# Patient Record
Sex: FEMALE | URBAN - METROPOLITAN AREA
[De-identification: names, ages, dates, MRNs, and addresses within clinical notes are randomized per-mention and may not be internally consistent; named-entity substitution may affect disease eponyms.]

---

## 2021-06-21 ENCOUNTER — APPOINTMENT (RX ONLY)
Dept: URBAN - METROPOLITAN AREA CLINIC 11 | Facility: CLINIC | Age: 61
Setting detail: DERMATOLOGY
End: 2021-06-21

## 2021-06-21 DIAGNOSIS — L20.89 OTHER ATOPIC DERMATITIS: ICD-10-CM | Status: INADEQUATELY CONTROLLED

## 2021-06-21 PROCEDURE — ? PHOTO-DOCUMENTATION

## 2021-06-21 PROCEDURE — 99204 OFFICE O/P NEW MOD 45 MIN: CPT

## 2021-06-21 PROCEDURE — ? COUNSELING

## 2021-06-21 PROCEDURE — ? MEDICATION COUNSELING

## 2021-06-21 PROCEDURE — ? ADDITIONAL NOTES

## 2021-06-21 PROCEDURE — ? PRESCRIPTION MEDICATION MANAGEMENT

## 2021-06-21 ASSESSMENT — LOCATION DETAILED DESCRIPTION DERM
LOCATION DETAILED: RIGHT INFERIOR ANTERIOR NECK
LOCATION DETAILED: LEFT DISTAL POSTERIOR UPPER ARM
LOCATION DETAILED: RIGHT DISTAL POSTERIOR UPPER ARM
LOCATION DETAILED: LEFT DISTAL PRETIBIAL REGION
LOCATION DETAILED: RIGHT DISTAL PRETIBIAL REGION

## 2021-06-21 ASSESSMENT — LOCATION SIMPLE DESCRIPTION DERM
LOCATION SIMPLE: RIGHT PRETIBIAL REGION
LOCATION SIMPLE: LEFT PRETIBIAL REGION
LOCATION SIMPLE: RIGHT ANTERIOR NECK
LOCATION SIMPLE: RIGHT POSTERIOR UPPER ARM
LOCATION SIMPLE: LEFT POSTERIOR UPPER ARM

## 2021-06-21 ASSESSMENT — LOCATION ZONE DERM
LOCATION ZONE: NECK
LOCATION ZONE: ARM
LOCATION ZONE: LEG

## 2021-06-21 NOTE — PROCEDURE: ADDITIONAL NOTES
Detail Level: Generalized
Additional Notes: Patient states that she has had severe eczema for many years.\\n\\nPatient states that she has had four separate biopsies- all of which show eczema.\\n\\nPatient’s dermatologist is Ervin Stephenson at [facility], but is looking for second opinion, as she would like to be on Prednisone long-term because she states it is the only treatment that works for her eczema.\\n\\nPatient has been on Dupixent for several years, and has been on weekly injections for 5 months.\\n\\nPatient states that she has previously been patch tested which showed numerous contact allergens. She is reportedly avoiding them to her best ability.\\n\\nPatient previously tried MTX for 3 months last year, but states that it was not effective.\\n\\nDiscussed potential SEs of long-term Prednisone: weak bones, glaucoma, stomach problems, weight gain. Strong recommendation against it.\\n\\nPatient has had a lot of stress in the last four years, due to many deaths in the family, including son.\\n\\nRecommended avoiding all allergens.\\n\\nPhotos taken today.\\n\\Edilia Herrmann will call patient after doing research on different treatment options.\\n\\nPatient is aware that she will need to go for BW prior to starting any new systemic medication.
Render Risk Assessment In Note?: no

## 2021-06-21 NOTE — PROCEDURE: MEDICATION COUNSELING
Xelysabelz Pregnancy And Lactation Text: This medication is Pregnancy Category D and is not considered safe during pregnancy.  The risk during breast feeding is also uncertain.

## 2021-06-21 NOTE — PROCEDURE: PRESCRIPTION MEDICATION MANAGEMENT
Continue Regimen: Dupixent
Render In Strict Bullet Format?: No
Detail Level: Zone
Plan: Consider cyclosporine, MTX, cellcept

## 2021-07-08 ENCOUNTER — APPOINTMENT (RX ONLY)
Dept: URBAN - METROPOLITAN AREA CLINIC 11 | Facility: CLINIC | Age: 61
Setting detail: DERMATOLOGY
End: 2021-07-08

## 2021-07-08 DIAGNOSIS — C84.0 MYCOSIS FUNGOIDES: ICD-10-CM | Status: WORSENING

## 2021-07-08 PROBLEM — L30.9 DERMATITIS, UNSPECIFIED: Status: ACTIVE | Noted: 2021-07-08

## 2021-07-08 PROCEDURE — ? ORDER TESTS

## 2021-07-08 PROCEDURE — ? MEDICATION COUNSELING

## 2021-07-08 PROCEDURE — 99214 OFFICE O/P EST MOD 30 MIN: CPT | Mod: 25

## 2021-07-08 PROCEDURE — ? BIOPSY BY SHAVE METHOD

## 2021-07-08 PROCEDURE — ? PRESCRIPTION

## 2021-07-08 PROCEDURE — 11102 TANGNTL BX SKIN SINGLE LES: CPT

## 2021-07-08 PROCEDURE — ? COUNSELING

## 2021-07-08 PROCEDURE — ? PHOTO-DOCUMENTATION

## 2021-07-08 PROCEDURE — ? ADDITIONAL NOTES

## 2021-07-08 RX ORDER — CLOBETASOL PROPIONATE 0.5 MG/G
CREAM TOPICAL BID
Qty: 1 | Refills: 3 | Status: ERX | COMMUNITY
Start: 2021-07-08

## 2021-07-08 RX ADMIN — CLOBETASOL PROPIONATE: 0.5 CREAM TOPICAL at 00:00

## 2021-07-08 ASSESSMENT — LOCATION ZONE DERM: LOCATION ZONE: TRUNK

## 2021-07-08 ASSESSMENT — LOCATION SIMPLE DESCRIPTION DERM
LOCATION SIMPLE: ABDOMEN
LOCATION SIMPLE: CHEST

## 2021-07-08 ASSESSMENT — LOCATION DETAILED DESCRIPTION DERM
LOCATION DETAILED: RIGHT MEDIAL SUPERIOR CHEST
LOCATION DETAILED: LEFT LATERAL ABDOMEN

## 2021-07-08 NOTE — PROCEDURE: MEDICATION COUNSELING
supervision Eucrisa Counseling: Patient may experience a mild burning sensation during topical application. Eucrisa is not approved in children less than 2 years of age.

## 2021-07-08 NOTE — PROCEDURE: MEDICATION COUNSELING
wine/liquor Topical Clindamycin Counseling: Patient counseled that this medication may cause skin irritation or allergic reactions.  In the event of skin irritation, the patient was advised to reduce the amount of the drug applied or use it less frequently.   The patient verbalized understanding of the proper use and possible adverse effects of clindamycin.  All of the patient's questions and concerns were addressed.

## 2021-07-08 NOTE — PROCEDURE: ADDITIONAL NOTES
Render Risk Assessment In Note?: no
Detail Level: Generalized
Additional Notes: Rash has worsened.\\n\\nWill send referral to [facility] for patch testing.\\n\\nWill send letter to patient’s PCP, Dr. Isela Riggs at [facility] to recommend that patient DC as many oral medications as she can.\\n\\nIf biopsy is suggestive of MF or psoriasis, will do second biopsy next week.\\n\\nDiscussed short course of Cyclosporine for 6 months versus Skyrizi.\\n\\nPatient will have BW done today.\\n\\nWill potentially give patient Skyrizi injection (sample) next week, depending on BW results.

## 2021-07-08 NOTE — PROCEDURE: MEDICATION COUNSELING
What Type Of Note Output Would You Prefer (Optional)?: Standard Output Is The Patient Presenting As Previously Scheduled?: Yes How Severe Is Your Rash?: mild Is This A New Presentation, Or A Follow-Up?: Rash Siliq Counseling:  I discussed with the patient the risks of Siliq including but not limited to new or worsening depression, suicidal thoughts and behavior, immunosuppression, malignancy, posterior leukoencephalopathy syndrome, and serious infections.  The patient understands that monitoring is required including a PPD at baseline and must alert us or the primary physician if symptoms of infection or other concerning signs are noted. There is also a special program designed to monitor depression which is required with Siliq.

## 2021-07-12 ENCOUNTER — RX ONLY (OUTPATIENT)
Age: 61
Setting detail: RX ONLY
End: 2021-07-12

## 2021-07-12 RX ORDER — HYDROCORTISONE 25 MG/G
CREAM TOPICAL
Qty: 1 | Refills: 2 | Status: ERX | COMMUNITY
Start: 2021-07-12

## 2021-07-20 ENCOUNTER — APPOINTMENT (RX ONLY)
Dept: URBAN - METROPOLITAN AREA CLINIC 11 | Facility: CLINIC | Age: 61
Setting detail: DERMATOLOGY
End: 2021-07-20

## 2021-07-20 DIAGNOSIS — L0391 CELLULITIS AND ABSCESS OF UNSPECIFIED SITES: ICD-10-CM

## 2021-07-20 DIAGNOSIS — L20.89 OTHER ATOPIC DERMATITIS: ICD-10-CM

## 2021-07-20 DIAGNOSIS — L0390 CELLULITIS AND ABSCESS OF UNSPECIFIED SITES: ICD-10-CM

## 2021-07-20 PROBLEM — L08.9 LOCAL INFECTION OF THE SKIN AND SUBCUTANEOUS TISSUE, UNSPECIFIED: Status: ACTIVE | Noted: 2021-07-20

## 2021-07-20 PROCEDURE — ? LAB REPORTS REVIEWED

## 2021-07-20 PROCEDURE — ? PRESCRIPTION MEDICATION MANAGEMENT

## 2021-07-20 PROCEDURE — ? ADDITIONAL NOTES

## 2021-07-20 PROCEDURE — 99213 OFFICE O/P EST LOW 20 MIN: CPT

## 2021-07-20 PROCEDURE — ? COUNSELING

## 2021-07-20 ASSESSMENT — LOCATION DETAILED DESCRIPTION DERM
LOCATION DETAILED: RIGHT LATERAL SUPERIOR CHEST
LOCATION DETAILED: RIGHT DISTAL PRETIBIAL REGION
LOCATION DETAILED: RIGHT DISTAL PRETIBIAL REGION

## 2021-07-20 ASSESSMENT — LOCATION ZONE DERM
LOCATION ZONE: TRUNK
LOCATION ZONE: LEG
LOCATION ZONE: LEG

## 2021-07-20 ASSESSMENT — LOCATION SIMPLE DESCRIPTION DERM
LOCATION SIMPLE: RIGHT PRETIBIAL REGION
LOCATION SIMPLE: CHEST
LOCATION SIMPLE: RIGHT PRETIBIAL REGION

## 2021-07-20 NOTE — PROCEDURE: ADDITIONAL NOTES
Additional Notes: Improvement.\\n\\nPatient will DC all medications.\\n\\nPatient has DCd some medications, and skin is clearing.\\n\\nDiscussed BW results, which showed elevated eosinophils.\\n\\nDr. Herrmann will research hypereosinophilic drugs.\\n\\nWill consider Skyrizi injection at next appointment.\\n\\nReevaluate in 1 week.
Render Risk Assessment In Note?: no
Detail Level: Generalized

## 2021-07-29 ENCOUNTER — APPOINTMENT (RX ONLY)
Dept: URBAN - METROPOLITAN AREA CLINIC 11 | Facility: CLINIC | Age: 61
Setting detail: DERMATOLOGY
End: 2021-07-29

## 2021-07-29 DIAGNOSIS — R60.0 LOCALIZED EDEMA: ICD-10-CM | Status: INADEQUATELY CONTROLLED

## 2021-07-29 DIAGNOSIS — L40.0 PSORIASIS VULGARIS: ICD-10-CM | Status: INADEQUATELY CONTROLLED

## 2021-07-29 PROCEDURE — ? SKYRIZI INJECTION

## 2021-07-29 PROCEDURE — ? LAB REPORTS REVIEWED

## 2021-07-29 PROCEDURE — 99214 OFFICE O/P EST MOD 30 MIN: CPT | Mod: 25

## 2021-07-29 PROCEDURE — ? ADDITIONAL NOTES

## 2021-07-29 PROCEDURE — ? COUNSELING

## 2021-07-29 PROCEDURE — ? MEDICATION COUNSELING

## 2021-07-29 PROCEDURE — ? PRESCRIPTION

## 2021-07-29 PROCEDURE — ? PATHOLOGY DISCUSSION

## 2021-07-29 PROCEDURE — ? SKYRIZI INITIATION

## 2021-07-29 PROCEDURE — 96372 THER/PROPH/DIAG INJ SC/IM: CPT

## 2021-07-29 RX ORDER — PEN NEEDLE, DIABETIC 32GX 5/32"
NEEDLE, DISPOSABLE MISCELLANEOUS
Qty: 2 | Refills: 0 | COMMUNITY
Start: 2021-07-29

## 2021-07-29 RX ADMIN — Medication: at 00:00

## 2021-07-29 ASSESSMENT — LOCATION SIMPLE DESCRIPTION DERM
LOCATION SIMPLE: RIGHT POSTERIOR UPPER ARM
LOCATION SIMPLE: LEFT FOREARM
LOCATION SIMPLE: ABDOMEN
LOCATION SIMPLE: RIGHT PRETIBIAL REGION
LOCATION SIMPLE: LEFT POSTERIOR UPPER ARM
LOCATION SIMPLE: RIGHT FOREARM
LOCATION SIMPLE: LEFT PRETIBIAL REGION

## 2021-07-29 ASSESSMENT — LOCATION ZONE DERM
LOCATION ZONE: ARM
LOCATION ZONE: TRUNK
LOCATION ZONE: LEG

## 2021-07-29 ASSESSMENT — LOCATION DETAILED DESCRIPTION DERM
LOCATION DETAILED: LEFT DISTAL PRETIBIAL REGION
LOCATION DETAILED: RIGHT DISTAL PRETIBIAL REGION
LOCATION DETAILED: LEFT PROXIMAL DORSAL FOREARM
LOCATION DETAILED: LEFT PROXIMAL POSTERIOR UPPER ARM
LOCATION DETAILED: RIGHT PROXIMAL DORSAL FOREARM
LOCATION DETAILED: LEFT LATERAL ABDOMEN
LOCATION DETAILED: RIGHT PROXIMAL POSTERIOR UPPER ARM

## 2021-07-29 ASSESSMENT — BSA PSORIASIS: % BODY COVERED IN PSORIASIS: 20

## 2021-07-29 NOTE — PROCEDURE: MEDICATION COUNSELING
Pharmacist Admission Medication Reconciliation Pending Note    Prior to Admission Medications were reviewed by the pharmacist and pended for provider review during admission medication reconciliation.    Medications were pended by the pharmacist at this time as follows:    Pended Admission Order Reconciliation Actions - Jennifer Hess [facility] 11/6/2020  6:54 PM     Order Name Action Reordered As    Cholecalciferol (VITAMIN D) 1000 UNITS capsule Order for Admission cholecalciferol (VITAMIN D) tablet 25 mcg    vitamin B-12 (CYANOCOBALAMIN) 1000 MCG tablet Order for Admission cyanocobalamin (Vitamin B-12) tablet 1,000 mcg    aspirin 81 MG tablet Order for Admission aspirin (ECOTRIN) enteric coated tablet 81 mg    Multiple Vitamin tablet Order for Admission vitamin - therapeutic multivitamins w/minerals tablet 1 tablet    Omega-3 Fatty Acids (FISH OIL PO) Do Not Order for Admission     atovaquone (MEPRON) 750 MG/5ML suspension Order for Admission atovaquone (MEPRON) suspension 1,500 mg    simvastatin (ZOCOR) 80 MG tablet Order for Admission atorvastatin (LIPITOR) tablet 40 mg    HYDROcodone-acetaminophen (NORCO) 5-325 MG per tablet Order for Admission HYDROcodone-acetaminophen (NORCO) 5-325 MG per tablet 1 tablet    levothyroxine 88 MCG tablet Order for Admission levothyroxine (SYNTHROID, LEVOTHROID) tablet 88 mcg    predniSONE (DELTASONE) 5 MG tablet Order for Admission predniSONE (DELTASONE) tablet 5 mg    omeprazole (PrilOSEC) 20 MG capsule Order for Admission pantoprazole (PROTONIX) EC tablet 40 mg    cloNIDine (CATAPRES) 0.2 MG tablet Order for Admission cloNIDine (CATAPRES) tablet 0.2 mg    ondansetron (Zofran) 8 MG tablet Order for Admission ondansetron (ZOFRAN) tablet 8 mg            Orders Pended To Continue For Hospital Stay     ID Description Pended By When Reason    [id number] HYDROcodone-acetaminophen (NORCO) 5-325 MG per tablet 1 tablet-EVERY 6 HOURS PRN Jennifer Hess [facility] 11/06/20 1854      [id number] aspirin (ECOTRIN) enteric coated tablet 81 [facility]DAILY [facility] 11/06/20 1854     [id number] ondansetron (ZOFRAN) tablet 8 mg-EVERY 8 HOURS PRN Jenniferarvind Hess, [facility] 11/06/20 1854     [id number] atorvastatin (LIPITOR) tablet 40 mg-NIGHTLY [facility] 11/06/20 1854     [id number] cloNIDine (CATAPRES) tablet 0.2 mg-2 TIMES DAILY [facility] 11/06/20 1854     [id number] predniSONE (DELTASONE) tablet 5 mg-DAILY [facility] 11/06/20 1854     [id number] cyanocobalamin (Vitamin B-12) tablet 1,000 mcg-DAILY [facility] 11/06/20 1854     [id number] cholecalciferol (VITAMIN D) tablet 25 mcg-DAILY [facility] 11/06/20 1854     [id number] levothyroxine (SYNTHROID, LEVOTHROID) tablet 88 mcg-DAILY BEFORE BREAKFAST [facility] 11/06/20 1854     [id number] vitamin - therapeutic multivitamins w/minerals tablet 1 tablet-DAILY [facility] 11/06/20 1854     [id number] atovaquone (MEPRON) suspension 1,500 mg-DAILY [facility] 11/06/20 1854     [id number] pantoprazole (PROTONIX) EC tablet 40 mg-DAILY PRN [facility] 11/06/20 1854             Pharmacist Notations:     Pertinent PTA medications resumed except:  -irbesartan, labetolol, diltiazem- patient hasn't been taking due to \"blood pressure being fine\"- provider to address and resume appropriate BP medications.    Last edited by Jennifer Hess [facility] on 11/06/20 at 1855          Orders that are ultimately reconciled and signed during admission medication reconciliation may differ from the pended actions above.    Please contact the pharmacist for questions.    Jennifer Hess RPH  11/6/2020 6:55 PM   Infliximab Counseling:  I discussed with the patient the risks of infliximab including but not limited to myelosuppression, immunosuppression, autoimmune hepatitis, demyelinating diseases, lymphoma, and serious infections.  The patient understands that monitoring is required including a PPD at baseline and must alert us or the primary physician if symptoms of infection or other concerning signs are noted.

## 2021-07-29 NOTE — PROCEDURE: SKYRIZI INITIATION
Is Methotrexate Contraindicated?: No
Skyrizi Monitoring Guidelines: A yearly test for tuberculosis is required while taking Skyrizi.
Skyrizi Dosing: 150 mg SC week 0 and week 4 then every 12 weeks thereafter
Detail Level: Zone
Pregnancy And Lactation Warning Text: The risk during pregnancy and breastfeeding is uncertain with this medication.
Diagnosis (Required): Psoriasis

## 2021-07-29 NOTE — PROCEDURE: ADDITIONAL NOTES
Additional Notes: Patient is still off all medications, including Dupixent.\\n\\nFirst injections of Skyrizi given today.\\n\\nOffice will obtain another sample for patient for next injection in 4 weeks.\\n\\nOffice will initiate Skyrizi paperwork today.\\n\\nReevaluate in 2 weeks.

## 2021-07-29 NOTE — PROCEDURE: ADDITIONAL NOTES
Additional Notes: Office will fax Rx to [facility] at [phone]. Additional Notes: Office will fax Rx to [facility] at [phone].

## 2021-08-12 ENCOUNTER — APPOINTMENT (RX ONLY)
Dept: URBAN - METROPOLITAN AREA CLINIC 11 | Facility: CLINIC | Age: 61
Setting detail: DERMATOLOGY
End: 2021-08-12

## 2021-08-12 DIAGNOSIS — L40.0 PSORIASIS VULGARIS: ICD-10-CM | Status: INADEQUATELY CONTROLLED

## 2021-08-12 DIAGNOSIS — S31000A OPEN WOUND(S) (MULTIPLE) OF UNSPECIFIED SITE(S), WITHOUT MENTION OF COMPLICATION: ICD-10-CM

## 2021-08-12 PROBLEM — S81.801A UNSPECIFIED OPEN WOUND, RIGHT LOWER LEG, INITIAL ENCOUNTER: Status: ACTIVE | Noted: 2021-08-12

## 2021-08-12 PROCEDURE — 29580 STRAPPING UNNA BOOT: CPT | Mod: LT

## 2021-08-12 PROCEDURE — 99214 OFFICE O/P EST MOD 30 MIN: CPT | Mod: 25

## 2021-08-12 PROCEDURE — ? ADDITIONAL NOTES

## 2021-08-12 PROCEDURE — ? MEDICATION COUNSELING

## 2021-08-12 PROCEDURE — ? PHOTO-DOCUMENTATION

## 2021-08-12 PROCEDURE — ? COUNSELING

## 2021-08-12 PROCEDURE — ? ORDER TESTS

## 2021-08-12 PROCEDURE — ? UNNA BOOT APPLICATION

## 2021-08-12 PROCEDURE — ? PRESCRIPTION MEDICATION MANAGEMENT

## 2021-08-12 ASSESSMENT — LOCATION DETAILED DESCRIPTION DERM
LOCATION DETAILED: RIGHT MEDIAL SUPERIOR CHEST
LOCATION DETAILED: LEFT PROXIMAL DORSAL FOREARM
LOCATION DETAILED: RIGHT DISTAL CALF
LOCATION DETAILED: RIGHT PROXIMAL DORSAL FOREARM
LOCATION DETAILED: LEFT DISTAL PRETIBIAL REGION
LOCATION DETAILED: LEFT MEDIAL UPPER BACK
LOCATION DETAILED: RIGHT DISTAL PRETIBIAL REGION

## 2021-08-12 ASSESSMENT — LOCATION ZONE DERM
LOCATION ZONE: TRUNK
LOCATION ZONE: LEG
LOCATION ZONE: ARM

## 2021-08-12 ASSESSMENT — LOCATION SIMPLE DESCRIPTION DERM
LOCATION SIMPLE: LEFT FOREARM
LOCATION SIMPLE: LEFT PRETIBIAL REGION
LOCATION SIMPLE: RIGHT PRETIBIAL REGION
LOCATION SIMPLE: RIGHT FOREARM
LOCATION SIMPLE: LEFT UPPER BACK
LOCATION SIMPLE: CHEST
LOCATION SIMPLE: RIGHT CALF

## 2021-08-12 NOTE — PROCEDURE: UNNA BOOT APPLICATION
Removal Of Previous Unna Boot (No) Text: The site was cleaned in preparation for an Unna Boot application.
Zinc Paste Impregnated Bandage Units: 1
After Unna Boot Application Text: Coban was used to wrap the outside of Unna Boot and we ensured the Unna Boot wasn't too tight prior to the patient leaving the office.
Medication Occluded Under Unnaboot: Bactroban
Indication: post-operative immobilization
Location Applied: the left leg
Removal Of Previous Unna Boot (Yes) Text: The previous Unna Boot and then the site was cleaned in preparation for another Unna Boot application.
Detail Level: Zone
Bill For Unna Boot Supplies?: No
Additional Instructions: Remove in seven days

## 2021-08-12 NOTE — PROCEDURE: ORDER TESTS
Bill For Surgical Tray: no
Billing Type: Third-Party Bill
Expected Date Of Service: 08/12/2021
Performing Laboratory: 0

## 2021-08-12 NOTE — PROCEDURE: PRESCRIPTION MEDICATION MANAGEMENT
Render In Strict Bullet Format?: No
Detail Level: Zone
Plan: OK to re-introduce medication from primary care provider one every two weeks.

## 2021-08-12 NOTE — PROCEDURE: ADDITIONAL NOTES
Render Risk Assessment In Note?: no
Additional Notes: Bryhali was applied to wounds before unna boot application.\\n\\nPatient will hold off on compression stockings fitting until after unna boot is removed.\\n\\nPatient will return in 1 week for unna boot removal.
Detail Level: Generalized
Additional Notes: Still active, but slight improvement compared to previous photos.\\n\\nPatient states that she was recently diagnosed with type II diabetes, and will start Metformin soon.\\n\\nPatient will start re-introducing medications once every two weeks.\\n\\nInterleukin panel discussed with patient and code given.\\n\\nPatient will call insurance to see if test would be covered.\\n\\nInsurance denied Skyrizi, but office will pursue appeal.\\n\\nIf denied after appeal and Skyrizi is helpful, will initiate Humira paperwork.\\n\\nPatient has previously failed Dupixent, MTX, Prednisone, and many topical steroids.\\n\\nPatient will follow-up next week for second Skyrizi injection (samples).

## 2021-08-19 ENCOUNTER — APPOINTMENT (RX ONLY)
Dept: URBAN - METROPOLITAN AREA CLINIC 11 | Facility: CLINIC | Age: 61
Setting detail: DERMATOLOGY
End: 2021-08-19

## 2021-08-19 ENCOUNTER — RX ONLY (OUTPATIENT)
Age: 61
Setting detail: RX ONLY
End: 2021-08-19

## 2021-08-19 DIAGNOSIS — R60.0 LOCALIZED EDEMA: ICD-10-CM

## 2021-08-19 DIAGNOSIS — L40.0 PSORIASIS VULGARIS: ICD-10-CM

## 2021-08-19 DIAGNOSIS — S31000A OPEN WOUND(S) (MULTIPLE) OF UNSPECIFIED SITE(S), WITHOUT MENTION OF COMPLICATION: ICD-10-CM | Status: IMPROVED

## 2021-08-19 PROBLEM — S81.801A UNSPECIFIED OPEN WOUND, RIGHT LOWER LEG, INITIAL ENCOUNTER: Status: ACTIVE | Noted: 2021-08-19

## 2021-08-19 PROCEDURE — ? MEDICATION COUNSELING

## 2021-08-19 PROCEDURE — ? ADDITIONAL NOTES

## 2021-08-19 PROCEDURE — 99214 OFFICE O/P EST MOD 30 MIN: CPT

## 2021-08-19 PROCEDURE — ? COUNSELING

## 2021-08-19 PROCEDURE — ? PRESCRIPTION MEDICATION MANAGEMENT

## 2021-08-19 RX ORDER — RISANKIZUMAB-RZAA 150 MG/ML
INJECTION SUBCUTANEOUS
Qty: 1 | Refills: 0 | Status: ERX | COMMUNITY
Start: 2021-08-19

## 2021-08-19 ASSESSMENT — LOCATION SIMPLE DESCRIPTION DERM
LOCATION SIMPLE: RIGHT FOREARM
LOCATION SIMPLE: LEFT FOREARM
LOCATION SIMPLE: CHEST
LOCATION SIMPLE: LEFT PRETIBIAL REGION
LOCATION SIMPLE: RIGHT PRETIBIAL REGION
LOCATION SIMPLE: RIGHT CALF

## 2021-08-19 ASSESSMENT — LOCATION ZONE DERM
LOCATION ZONE: TRUNK
LOCATION ZONE: ARM
LOCATION ZONE: LEG

## 2021-08-19 ASSESSMENT — LOCATION DETAILED DESCRIPTION DERM
LOCATION DETAILED: RIGHT DISTAL CALF
LOCATION DETAILED: LEFT DISTAL PRETIBIAL REGION
LOCATION DETAILED: LEFT PROXIMAL DORSAL FOREARM
LOCATION DETAILED: RIGHT MEDIAL SUPERIOR CHEST
LOCATION DETAILED: RIGHT DISTAL PRETIBIAL REGION
LOCATION DETAILED: RIGHT PROXIMAL DORSAL FOREARM

## 2021-08-19 NOTE — PROCEDURE: ADDITIONAL NOTES
Detail Level: Generalized
Render Risk Assessment In Note?: no
Additional Notes: Still active, but slight improvement compared to previous photos.\\n\\nPatient states that she was recently diagnosed with type II diabetes, and will start Metformin soon.\\n\\nPatient has started re-introducing medications once every two weeks.\\n\\nInterleukin panel discussed with patient.\\n\\nPatient will call insurance to see if test would be covered.\\n\\nInsurance denied Skyrizi, but office has pursued appeal.\\n\\nPatient will fill out application for Bridge program.\\n\\nIf unable to go into Bridge program and Skyrizi is helpful, will initiate Humira paperwork or discuss Cyclosporine again.\\n\\nPatient has previously failed Dupixent, MTX, Prednisone, and many topical steroids.\\n\\nPatient will follow-up next week for second Skyrizi injection (samples).
Additional Notes: Patient will begin wearing compression stockings.
Additional Notes: Improved.\\n\\nPatient states that Regi Boot fell off after three days.

## 2021-08-23 ENCOUNTER — APPOINTMENT (RX ONLY)
Dept: URBAN - METROPOLITAN AREA CLINIC 11 | Facility: CLINIC | Age: 61
Setting detail: DERMATOLOGY
End: 2021-08-23

## 2021-08-23 ENCOUNTER — RX ONLY (OUTPATIENT)
Age: 61
Setting detail: RX ONLY
End: 2021-08-23

## 2021-08-23 DIAGNOSIS — L40.0 PSORIASIS VULGARIS: ICD-10-CM

## 2021-08-23 PROBLEM — L30.9 DERMATITIS, UNSPECIFIED: Status: ACTIVE | Noted: 2021-08-23

## 2021-08-23 PROCEDURE — ? ORDER TESTS

## 2021-08-23 RX ORDER — DEXAMETHASONE 4 MG/1
TABLET ORAL
Qty: 6 | Refills: 0 | Status: ERX | COMMUNITY
Start: 2021-08-23

## 2021-08-23 RX ORDER — MUPIROCIN 20 MG/G
OINTMENT TOPICAL
Qty: 1 | Refills: 1 | Status: ERX | COMMUNITY
Start: 2021-08-23

## 2021-08-23 RX ORDER — RISANKIZUMAB-RZAA 150 MG/ML
INJECTION SUBCUTANEOUS
Qty: 2 | Refills: 0 | Status: ERX

## 2021-08-23 NOTE — PROCEDURE: ORDER TESTS
Bill For Surgical Tray: no
Expected Date Of Service: 08/23/2021
Billing Type: Third-Party Bill
Performing Laboratory: [id number]

## 2021-08-25 ENCOUNTER — APPOINTMENT (RX ONLY)
Dept: URBAN - METROPOLITAN AREA CLINIC 11 | Facility: CLINIC | Age: 61
Setting detail: DERMATOLOGY
End: 2021-08-25

## 2021-08-25 DIAGNOSIS — R60.0 LOCALIZED EDEMA: ICD-10-CM | Status: IMPROVED

## 2021-08-25 DIAGNOSIS — B00.0 ECZEMA HERPETICUM: ICD-10-CM

## 2021-08-25 DIAGNOSIS — S31000A OPEN WOUND(S) (MULTIPLE) OF UNSPECIFIED SITE(S), WITHOUT MENTION OF COMPLICATION: ICD-10-CM | Status: IMPROVED

## 2021-08-25 DIAGNOSIS — L40.0 PSORIASIS VULGARIS: ICD-10-CM | Status: INADEQUATELY CONTROLLED

## 2021-08-25 PROBLEM — S81.801A UNSPECIFIED OPEN WOUND, RIGHT LOWER LEG, INITIAL ENCOUNTER: Status: ACTIVE | Noted: 2021-08-25

## 2021-08-25 PROCEDURE — ? PRESCRIPTION

## 2021-08-25 PROCEDURE — ? MEDICATION COUNSELING

## 2021-08-25 PROCEDURE — ? COUNSELING

## 2021-08-25 PROCEDURE — 96372 THER/PROPH/DIAG INJ SC/IM: CPT

## 2021-08-25 PROCEDURE — ? PRESCRIPTION MEDICATION MANAGEMENT

## 2021-08-25 PROCEDURE — 99214 OFFICE O/P EST MOD 30 MIN: CPT | Mod: 25

## 2021-08-25 PROCEDURE — ? ADDITIONAL NOTES

## 2021-08-25 PROCEDURE — ? SKYRIZI INJECTION

## 2021-08-25 RX ORDER — VALACYCLOVIR HYDROCHLORIDE 1 G/1
TABLET, FILM COATED ORAL
Qty: 14 | Refills: 0 | Status: ERX | COMMUNITY
Start: 2021-08-25

## 2021-08-25 RX ADMIN — VALACYCLOVIR HYDROCHLORIDE: 1 TABLET, FILM COATED ORAL at 00:00

## 2021-08-25 ASSESSMENT — LOCATION ZONE DERM
LOCATION ZONE: LEG
LOCATION ZONE: TRUNK

## 2021-08-25 ASSESSMENT — LOCATION SIMPLE DESCRIPTION DERM
LOCATION SIMPLE: LEFT PRETIBIAL REGION
LOCATION SIMPLE: RIGHT THIGH
LOCATION SIMPLE: RIGHT PRETIBIAL REGION
LOCATION SIMPLE: LEFT THIGH
LOCATION SIMPLE: CHEST
LOCATION SIMPLE: RIGHT CALF

## 2021-08-25 ASSESSMENT — LOCATION DETAILED DESCRIPTION DERM
LOCATION DETAILED: LEFT DISTAL PRETIBIAL REGION
LOCATION DETAILED: LEFT ANTERIOR PROXIMAL THIGH
LOCATION DETAILED: RIGHT DISTAL CALF
LOCATION DETAILED: RIGHT DISTAL PRETIBIAL REGION
LOCATION DETAILED: RIGHT ANTERIOR PROXIMAL THIGH
LOCATION DETAILED: RIGHT MEDIAL SUPERIOR CHEST

## 2021-08-25 ASSESSMENT — PGA PSORIASIS: PGA PSORIASIS 2020: MODERATE

## 2021-08-25 ASSESSMENT — BSA PSORIASIS: % BODY COVERED IN PSORIASIS: 15

## 2021-08-25 NOTE — PROCEDURE: PRESCRIPTION MEDICATION MANAGEMENT
Detail Level: Zone
Continue Regimen: Dexamethasone course.
Render In Strict Bullet Format?: No
Continue Regimen: Clobetasol cream.
Continue Regimen: Mupirocin.

## 2021-08-25 NOTE — PROCEDURE: MEDICATION COUNSELING
18-Jan-2021 Enbrel Counseling:  I discussed with the patient the risks of etanercept including but not limited to myelosuppression, immunosuppression, autoimmune hepatitis, demyelinating diseases, lymphoma, and infections.  The patient understands that monitoring is required including a PPD at baseline and must alert us or the primary physician if symptoms of infection or other concerning signs are noted.

## 2021-08-25 NOTE — PROCEDURE: MEDICATION COUNSELING
P:  1.  GBS @ 35 wks.          2.  Continue FKCs.          3.  Questions answered.          4.  Encouraged pt to tour L&D.          5.  Encourage adequate water intake.        6.  F/u 2 wks.        7.  D/w pt helps for low back pain.  Encouraged pregnancy belt.         8.  PTL precautions reviewed w pt.      High Dose Vitamin A Pregnancy And Lactation Text: High dose vitamin A therapy is contraindicated during pregnancy and breast feeding.

## 2021-08-25 NOTE — PROCEDURE: ADDITIONAL NOTES
Render Risk Assessment In Note?: no
Additional Notes: Improved.\\n\\nPatient states that Regi Boot fell off after three days.
Detail Level: Generalized
Additional Notes: Patient will begin wearing compression stockings.
Additional Notes: Flaring.\\n\\nPatient will continue re-introducing medications once every two weeks.\\n\\nOffice is waiting for [facility] to send BW results for Interlukin panel.\\n\\nInsurance denied Skyrizi, but patient may be enrolled into Bridge Program.\\n\\nIf Skyrizi is not successful, will discuss Cyclosporine again.\\n\\nPatient has previously failed Dupixent, MTX, Prednisone, and many topical steroids.\\n\\nReevaluate in 2 weeks.

## 2021-09-02 ENCOUNTER — APPOINTMENT (RX ONLY)
Dept: URBAN - METROPOLITAN AREA CLINIC 11 | Facility: CLINIC | Age: 61
Setting detail: DERMATOLOGY
End: 2021-09-02

## 2021-09-02 DIAGNOSIS — B00.0 ECZEMA HERPETICUM: ICD-10-CM | Status: INADEQUATELY CONTROLLED

## 2021-09-02 DIAGNOSIS — S31000A OPEN WOUND(S) (MULTIPLE) OF UNSPECIFIED SITE(S), WITHOUT MENTION OF COMPLICATION: ICD-10-CM | Status: RESOLVED

## 2021-09-02 DIAGNOSIS — L56.0 DRUG PHOTOTOXIC RESPONSE: ICD-10-CM | Status: INADEQUATELY CONTROLLED

## 2021-09-02 PROBLEM — S81.801A UNSPECIFIED OPEN WOUND, RIGHT LOWER LEG, INITIAL ENCOUNTER: Status: ACTIVE | Noted: 2021-09-02

## 2021-09-02 PROCEDURE — ? PRESCRIPTION

## 2021-09-02 PROCEDURE — 99214 OFFICE O/P EST MOD 30 MIN: CPT

## 2021-09-02 PROCEDURE — ? ADDITIONAL NOTES

## 2021-09-02 PROCEDURE — ? COUNSELING

## 2021-09-02 PROCEDURE — ? LAB REPORTS REVIEWED

## 2021-09-02 PROCEDURE — ? PRESCRIPTION MEDICATION MANAGEMENT

## 2021-09-02 PROCEDURE — ? ORDER TESTS

## 2021-09-02 PROCEDURE — ? MEDICATION COUNSELING

## 2021-09-02 RX ORDER — TRIAMCINOLONE ACETONIDE 1 MG/G
CREAM TOPICAL BID
Qty: 454 | Refills: 2 | Status: ERX | COMMUNITY
Start: 2021-09-02

## 2021-09-02 RX ORDER — CYCLOSPORINE 100 MG/1
CAPSULE, LIQUID FILLED ORAL
Qty: 90 | Refills: 1 | Status: ERX | COMMUNITY
Start: 2021-09-02

## 2021-09-02 RX ADMIN — TRIAMCINOLONE ACETONIDE: 1 CREAM TOPICAL at 00:00

## 2021-09-02 RX ADMIN — CYCLOSPORINE: 100 CAPSULE, LIQUID FILLED ORAL at 00:00

## 2021-09-02 ASSESSMENT — LOCATION ZONE DERM
LOCATION ZONE: ARM
LOCATION ZONE: TRUNK
LOCATION ZONE: LEG

## 2021-09-02 ASSESSMENT — LOCATION DETAILED DESCRIPTION DERM
LOCATION DETAILED: LEFT ANTERIOR DISTAL UPPER ARM
LOCATION DETAILED: RIGHT DISTAL CALF
LOCATION DETAILED: RIGHT ANTERIOR DISTAL UPPER ARM
LOCATION DETAILED: RIGHT MEDIAL SUPERIOR CHEST
LOCATION DETAILED: RIGHT DISTAL PRETIBIAL REGION

## 2021-09-02 ASSESSMENT — LOCATION SIMPLE DESCRIPTION DERM
LOCATION SIMPLE: LEFT UPPER ARM
LOCATION SIMPLE: RIGHT UPPER ARM
LOCATION SIMPLE: RIGHT PRETIBIAL REGION
LOCATION SIMPLE: CHEST
LOCATION SIMPLE: RIGHT CALF

## 2021-09-02 NOTE — HPI: RASH
What Type Of Note Output Would You Prefer (Optional)?: Bullet Format
Is The Patient Presenting As Previously Scheduled?: No, they are coming in before their scheduled appointment
How Severe Is Your Rash?: severe
Is This A New Presentation, Or A Follow-Up?: Follow Up Rash

## 2021-09-02 NOTE — PROCEDURE: ADDITIONAL NOTES
Additional Notes: I believe this represents a photo allergic drug rxn. It could also be photo contact. There has to be an ongoing stimulus that allows the rash to persist despite the correct interleukin blockade. So either a drug she’s taking or a contact allergy and she’s been repeatedly exposed to. Strong photo component. Positive results from initial drug holiday. \\n\\nFlaring.\\n\\nPatient states that she started flaring two days ago.\\n\\nRash has been present for 5 years.\\n\\nPatient restarted Omeprazole 4-5 days ago.\\n\\nPatient has only been on Metformin for 2 weeks.\\n\\nAdvised patient to go back on medication holiday by cutting out most recently re-started medication.\\n\\nAdvised patient to switch from Omeprazole to Famotidine.\\n\\nDiscussed Interlukin BW, which showed that patient would most benefit from Dupixent out of all biologics, but patient was on Dupixent for three years and was injecting Q week since January until office had her DC.\\n\\nPatient has history of tachycardia and diabetes.\\n\\[id number].\\n\\nPatient has B/P cuff at home and will monitor.\\n\\nPatient will get BW done tomorrow before starting Cyclosporine.\\n\\nOffice will not pursue Skyrizi further, based on BW results.\\n\\nReevaluate next week. Additional Notes: I believe this represents a photo allergic drug rxn. It could also be photo contact. There has to be an ongoing stimulus that allows the rash to persist despite the correct interleukin blockade. So either a drug she’s taking or a contact allergy and she’s been repeatedly exposed to. Strong photo component. Positive results from initial drug holiday. \\n\\nFlaring.\\n\\nPatient states that she started flaring two days ago.\\n\\nRash has been present for 5 years.\\n\\nPatient restarted Omeprazole 4-5 days ago.\\n\\nPatient has only been on Metformin for 2 weeks.\\n\\nAdvised patient to go back on medication holiday by cutting out most recently re-started medication.\\n\\nAdvised patient to switch from Omeprazole to Famotidine.\\n\\nDiscussed Interlukin BW, which showed that patient would most benefit from Dupixent out of all biologics, but patient was on Dupixent for three years and was injecting Q week since January until office had her DC.\\n\\nPatient has history of tachycardia and diabetes.\\n\\[id number].\\n\\nPatient has B/P cuff at home and will monitor.\\n\\nPatient will get BW done tomorrow before starting Cyclosporine.\\n\\nOffice will not pursue Skyrizi further, based on BW results.\\n\\nReevaluate next week.

## 2021-09-02 NOTE — PROCEDURE: ORDER TESTS
Billing Type: Third-Party Bill
Bill For Surgical Tray: no
Performing Laboratory: 0
Expected Date Of Service: 09/02/2021

## 2021-09-08 ENCOUNTER — APPOINTMENT (RX ONLY)
Dept: URBAN - METROPOLITAN AREA CLINIC 11 | Facility: CLINIC | Age: 61
Setting detail: DERMATOLOGY
End: 2021-09-08

## 2021-09-08 DIAGNOSIS — L44.0 PITYRIASIS RUBRA PILARIS: ICD-10-CM | Status: WORSENING

## 2021-09-08 PROBLEM — L30.9 DERMATITIS, UNSPECIFIED: Status: ACTIVE | Noted: 2021-09-08

## 2021-09-08 PROCEDURE — ? MEDICATION COUNSELING

## 2021-09-08 PROCEDURE — ? LAB REPORTS REVIEWED

## 2021-09-08 PROCEDURE — ? PRESCRIPTION

## 2021-09-08 PROCEDURE — ? COUNSELING

## 2021-09-08 PROCEDURE — 99214 OFFICE O/P EST MOD 30 MIN: CPT

## 2021-09-08 PROCEDURE — ? ADDITIONAL NOTES

## 2021-09-08 PROCEDURE — ? PRESCRIPTION MEDICATION MANAGEMENT

## 2021-09-08 RX ORDER — CYCLOSPORINE 100 MG/1
CAPSULE, LIQUID FILLED ORAL
Qty: 90 | Refills: 1 | Status: ERX

## 2021-09-08 RX ORDER — CEPHALEXIN 500 MG/1
CAPSULE ORAL
Qty: 15 | Refills: 0 | Status: ERX | COMMUNITY
Start: 2021-09-08

## 2021-09-08 RX ORDER — CYCLOSPORINE 100 MG/1
CAPSULE, LIQUID FILLED ORAL
Qty: 90 | Refills: 0 | Status: CANCELLED
Stop reason: SDUPTHER

## 2021-09-08 RX ADMIN — CEPHALEXIN: 500 CAPSULE ORAL at 00:00

## 2021-09-08 ASSESSMENT — LOCATION ZONE DERM
LOCATION ZONE: LEG
LOCATION ZONE: TRUNK
LOCATION ZONE: ARM

## 2021-09-08 ASSESSMENT — LOCATION SIMPLE DESCRIPTION DERM
LOCATION SIMPLE: LEFT UPPER BACK
LOCATION SIMPLE: RIGHT FOREARM
LOCATION SIMPLE: LEFT PRETIBIAL REGION
LOCATION SIMPLE: RIGHT PRETIBIAL REGION
LOCATION SIMPLE: LEFT FOREARM
LOCATION SIMPLE: CHEST

## 2021-09-08 ASSESSMENT — LOCATION DETAILED DESCRIPTION DERM
LOCATION DETAILED: RIGHT LATERAL SUPERIOR CHEST
LOCATION DETAILED: LEFT SUPERIOR MEDIAL UPPER BACK
LOCATION DETAILED: LEFT PROXIMAL DORSAL FOREARM
LOCATION DETAILED: RIGHT PROXIMAL DORSAL FOREARM
LOCATION DETAILED: LEFT PROXIMAL PRETIBIAL REGION
LOCATION DETAILED: RIGHT PROXIMAL PRETIBIAL REGION

## 2021-09-08 NOTE — PROCEDURE: PRESCRIPTION MEDICATION MANAGEMENT
Render In Strict Bullet Format?: No
Detail Level: Zone
Continue Regimen: TAC cream.
Initiate Treatment: Patient has 1 month supply of Dupixent at home, which she will restart.\\n~

## 2021-09-08 NOTE — PROCEDURE: ADDITIONAL NOTES
Additional Notes: Flaring.\\n\\nPatient is very uncomfortable.\\n\\nSending patient’s Cyclosporine to [facility], as patient states [facility] is out-of-stock.\\n\\nWill find out if [facility] still has in-patient dermatology clinic.\\n\\nReevaluate in 1 week. Additional Notes: Flaring.\\n\\nPatient is very uncomfortable.\\n\\nSending patient’s Cyclosporine to [facility], as patient states [facility] is out-of-stock.\\n\\nWill find out if [facility] still has in-patient dermatology clinic.\\n\\nReevaluate in 1 week.

## 2021-09-16 ENCOUNTER — APPOINTMENT (RX ONLY)
Dept: URBAN - METROPOLITAN AREA CLINIC 11 | Facility: CLINIC | Age: 61
Setting detail: DERMATOLOGY
End: 2021-09-16

## 2021-09-16 DIAGNOSIS — Z79.899 OTHER LONG TERM (CURRENT) DRUG THERAPY: ICD-10-CM

## 2021-09-16 PROCEDURE — ? ORDER TESTS

## 2021-09-16 NOTE — PROCEDURE: ORDER TESTS
Bill For Surgical Tray: no
Billing Type: Third-Party Bill
Expected Date Of Service: 09/16/2021
Performing Laboratory: 0

## 2024-01-02 NOTE — PROCEDURE: MEDICATION COUNSELING
Libtayo Pregnancy And Lactation Text: This medication is contraindicated in pregnancy and when breast feeding. NEGATIVE

## 2024-01-17 NOTE — PROCEDURE: MEDICATION COUNSELING
What Type Of Note Output Would You Prefer (Optional)?: Standard Output How Severe Is Your Skin Lesion?: severe Is This A New Presentation, Or A Follow-Up?: Skin Lesion Additional History: Patient states a procedure was done on this site by a previous dermatologist. Finasteride Pregnancy And Lactation Text: This medication is absolutely contraindicated during pregnancy. It is unknown if it is excreted in breast milk.